# Patient Record
Sex: MALE | Race: WHITE | NOT HISPANIC OR LATINO | Employment: OTHER | URBAN - METROPOLITAN AREA
[De-identification: names, ages, dates, MRNs, and addresses within clinical notes are randomized per-mention and may not be internally consistent; named-entity substitution may affect disease eponyms.]

---

## 2023-08-16 ENCOUNTER — HOSPITAL ENCOUNTER (OUTPATIENT)
Dept: DATA CONVERSION | Facility: HOSPITAL | Age: 73
Discharge: HOME | End: 2023-08-16
Payer: MEDICARE

## 2023-08-16 DIAGNOSIS — Z00.00 ENCOUNTER FOR GENERAL ADULT MEDICAL EXAMINATION WITHOUT ABNORMAL FINDINGS: ICD-10-CM

## 2023-08-16 DIAGNOSIS — M06.09 RHEUMATOID ARTHRITIS WITHOUT RHEUMATOID FACTOR, MULTIPLE SITES (MULTI): ICD-10-CM

## 2023-09-03 PROBLEM — M25.512 SHOULDER PAIN, LEFT: Status: ACTIVE | Noted: 2018-08-05

## 2023-09-03 PROBLEM — R55 SYNCOPE: Status: ACTIVE | Noted: 2023-06-01

## 2023-09-03 PROBLEM — N49.2 ABSCESS OF SCROTUM: Status: ACTIVE | Noted: 2023-09-03

## 2023-09-03 PROBLEM — S56.911A STRAIN OF RIGHT FOREARM: Status: ACTIVE | Noted: 2018-07-18

## 2023-09-03 PROBLEM — L40.9 PSORIASIS: Status: ACTIVE | Noted: 2019-08-01

## 2023-09-03 PROBLEM — E78.5 HYPERLIPIDEMIA: Status: ACTIVE | Noted: 2023-01-20

## 2023-09-03 PROBLEM — R45.4 IRRITABILITY AND ANGER: Status: ACTIVE | Noted: 2022-12-15

## 2023-09-03 PROBLEM — L30.1 DYSHIDROTIC ECZEMA: Status: ACTIVE | Noted: 2018-08-21

## 2023-09-03 PROBLEM — R07.89 ATYPICAL CHEST PAIN: Status: ACTIVE | Noted: 2022-05-03

## 2023-09-03 PROBLEM — R05.9 COUGH: Status: RESOLVED | Noted: 2019-01-28 | Resolved: 2023-09-03

## 2023-09-03 PROBLEM — R42 VERTIGO: Status: ACTIVE | Noted: 2018-06-30

## 2023-09-03 PROBLEM — R06.02 EXERTIONAL SHORTNESS OF BREATH: Status: ACTIVE | Noted: 2019-01-31

## 2023-09-03 PROBLEM — F32.A DEPRESSION: Status: ACTIVE | Noted: 2019-11-21

## 2023-09-03 PROBLEM — G47.30 SLEEP APNEA: Status: ACTIVE | Noted: 2022-08-21

## 2023-09-03 PROBLEM — N50.819 PAIN IN TESTICLE: Status: ACTIVE | Noted: 2023-09-03

## 2023-09-03 PROBLEM — M79.609 LIMB PAIN: Status: ACTIVE | Noted: 2023-09-03
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
## 2023-09-03 PROBLEM — R53.83 FATIGUE: Status: ACTIVE | Noted: 2021-08-04

## 2023-09-03 PROBLEM — R06.83 SNORING: Status: ACTIVE | Noted: 2022-05-04

## 2023-09-03 PROBLEM — M65.30 TRIGGER FINGER, RIGHT: Status: ACTIVE | Noted: 2019-12-05

## 2023-09-03 PROBLEM — R07.81 RIB PAIN ON LEFT SIDE: Status: ACTIVE | Noted: 2020-11-05

## 2023-09-03 PROBLEM — M25.521 RIGHT ELBOW PAIN: Status: ACTIVE | Noted: 2018-07-18

## 2023-09-03 PROBLEM — H11.31 SUBCONJUNCTIVAL HEMORRHAGE OF RIGHT EYE: Status: ACTIVE | Noted: 2020-03-04

## 2023-09-03 PROBLEM — S16.1XXA NECK STRAIN: Status: ACTIVE | Noted: 2019-06-11

## 2023-09-03 PROBLEM — R06.2 WHEEZING: Status: ACTIVE | Noted: 2023-09-03

## 2023-09-03 PROBLEM — K63.5 COLON POLYPS: Status: ACTIVE | Noted: 2023-01-20

## 2023-09-03 PROBLEM — R51.9 HEADACHE: Status: ACTIVE | Noted: 2023-09-03

## 2023-09-03 PROBLEM — R50.9 FEVER: Status: RESOLVED | Noted: 2021-10-19 | Resolved: 2023-09-03

## 2023-09-03 PROBLEM — S52.90XA CLOSED FRACTURE OF RADIUS: Status: ACTIVE | Noted: 2023-09-03

## 2023-09-03 PROBLEM — M19.90 ARTHRITIS: Status: ACTIVE | Noted: 2023-09-03

## 2023-09-03 PROBLEM — E66.9 OBESITY: Status: ACTIVE | Noted: 2023-09-03

## 2023-09-03 PROBLEM — G57.60 MORTON NEUROMA: Status: ACTIVE | Noted: 2018-08-21

## 2023-09-03 PROBLEM — M54.30 SCIATICA: Status: ACTIVE | Noted: 2018-07-19

## 2023-09-03 PROBLEM — R31.9 HEMATURIA: Status: ACTIVE | Noted: 2023-03-16

## 2023-09-03 PROBLEM — J01.90 ACUTE SINUSITIS: Status: RESOLVED | Noted: 2020-01-17 | Resolved: 2023-09-03

## 2023-09-03 PROBLEM — M79.604 LOW BACK PAIN RADIATING TO RIGHT LOWER EXTREMITY: Status: ACTIVE | Noted: 2018-08-05

## 2023-09-03 PROBLEM — F07.81 POSTCONCUSSION SYNDROME: Status: ACTIVE | Noted: 2023-09-03

## 2023-09-03 PROBLEM — N18.2 STAGE 2 CHRONIC KIDNEY DISEASE: Status: ACTIVE | Noted: 2023-09-03

## 2023-09-03 PROBLEM — S49.90XA INJURY OF UPPER EXTREMITY: Status: ACTIVE | Noted: 2023-09-03

## 2023-09-03 PROBLEM — R06.89 BREATHING DIFFICULT: Status: ACTIVE | Noted: 2022-07-26

## 2023-09-03 PROBLEM — I10 HYPERTENSION: Status: ACTIVE | Noted: 2018-12-03

## 2023-09-03 PROBLEM — R60.9 EDEMA: Status: ACTIVE | Noted: 2023-01-20

## 2023-09-03 PROBLEM — M79.641 RIGHT HAND PAIN: Status: ACTIVE | Noted: 2019-11-21

## 2023-09-03 PROBLEM — M54.50 LOW BACK PAIN RADIATING TO RIGHT LOWER EXTREMITY: Status: ACTIVE | Noted: 2018-08-05

## 2023-09-03 PROBLEM — I13.10 HYPERTENSIVE HEART AND KIDNEY DISEASE: Status: ACTIVE | Noted: 2023-09-03

## 2023-09-03 PROBLEM — I47.29 NONSUSTAINED VENTRICULAR TACHYCARDIA (MULTI): Status: ACTIVE | Noted: 2023-09-03

## 2023-09-03 PROBLEM — L60.8 PITTING OF NAILS: Status: ACTIVE | Noted: 2021-08-04

## 2023-09-03 PROBLEM — R52 BODY ACHES: Status: ACTIVE | Noted: 2021-10-19

## 2023-09-03 PROBLEM — J44.9 CHRONIC OBSTRUCTIVE PULMONARY DISEASE (MULTI): Status: ACTIVE | Noted: 2023-09-03

## 2023-09-03 PROBLEM — G57.61 LESION OF PLANTAR NERVE, RIGHT LOWER LIMB: Status: ACTIVE | Noted: 2018-08-21

## 2023-09-03 PROBLEM — K13.70 ORAL MUCOSAL LESION: Status: ACTIVE | Noted: 2019-10-25

## 2023-09-03 PROBLEM — R20.0 NUMBNESS: Status: ACTIVE | Noted: 2023-09-03

## 2023-09-03 RX ORDER — AMLODIPINE BESYLATE 10 MG/1
1 TABLET ORAL DAILY
COMMUNITY
End: 2023-10-17 | Stop reason: ALTCHOICE

## 2023-09-03 RX ORDER — FLUOXETINE HYDROCHLORIDE 20 MG/1
1 CAPSULE ORAL DAILY
COMMUNITY
End: 2023-10-17 | Stop reason: ALTCHOICE

## 2023-09-03 RX ORDER — ETANERCEPT 50 MG/ML
SOLUTION SUBCUTANEOUS
COMMUNITY
Start: 2016-05-16 | End: 2023-10-17 | Stop reason: ALTCHOICE

## 2023-09-03 RX ORDER — FOLIC ACID 1 MG/1
1 TABLET ORAL DAILY
COMMUNITY
Start: 2016-05-20

## 2023-09-03 RX ORDER — ALBUTEROL SULFATE 90 UG/1
2 POWDER, METERED RESPIRATORY (INHALATION) EVERY 6 HOURS PRN
COMMUNITY
Start: 2019-01-29 | End: 2023-10-17 | Stop reason: ALTCHOICE

## 2023-09-03 RX ORDER — FLUTICASONE FUROATE AND VILANTEROL 200; 25 UG/1; UG/1
1 POWDER RESPIRATORY (INHALATION) DAILY
COMMUNITY
End: 2023-10-17 | Stop reason: ALTCHOICE

## 2023-09-03 RX ORDER — ALBUTEROL SULFATE 90 UG/1
2 AEROSOL, METERED RESPIRATORY (INHALATION) EVERY 6 HOURS PRN
COMMUNITY

## 2023-09-03 RX ORDER — ERGOCALCIFEROL 1.25 MG/1
CAPSULE ORAL
COMMUNITY
Start: 2016-07-19 | End: 2023-10-17 | Stop reason: ALTCHOICE

## 2023-09-03 RX ORDER — METOPROLOL SUCCINATE 25 MG/1
25 TABLET, EXTENDED RELEASE ORAL DAILY
COMMUNITY
Start: 2023-05-30 | End: 2023-12-12 | Stop reason: SDUPTHER

## 2023-09-03 RX ORDER — CHOLECALCIFEROL (VITAMIN D3) 50 MCG
1 TABLET ORAL DAILY
COMMUNITY
Start: 2018-06-30 | End: 2023-10-17 | Stop reason: ALTCHOICE

## 2023-09-03 RX ORDER — METHOTREXATE 2.5 MG/1
6 TABLET ORAL
COMMUNITY

## 2023-09-03 RX ORDER — CHOLECALCIFEROL (VITAMIN D3) 50 MCG
1 TABLET ORAL DAILY
COMMUNITY

## 2023-09-03 RX ORDER — IBUPROFEN 600 MG/1
1 TABLET ORAL 3 TIMES DAILY PRN
COMMUNITY
End: 2023-10-17 | Stop reason: ALTCHOICE

## 2023-09-03 RX ORDER — LANOLIN ALCOHOL/MO/W.PET/CERES
400 CREAM (GRAM) TOPICAL 2 TIMES DAILY
COMMUNITY
Start: 2023-05-30

## 2023-09-03 RX ORDER — POTASSIUM CHLORIDE 20 MEQ/1
1 TABLET, EXTENDED RELEASE ORAL DAILY
COMMUNITY
Start: 2023-05-30 | End: 2023-12-12 | Stop reason: SDUPTHER

## 2023-09-03 RX ORDER — ASPIRIN 81 MG/1
81 TABLET ORAL DAILY
COMMUNITY
Start: 2018-06-30

## 2023-09-03 RX ORDER — LISINOPRIL 5 MG/1
5 TABLET ORAL DAILY
COMMUNITY
Start: 2023-05-30

## 2023-09-03 RX ORDER — GOLIMUMAB 50 MG/4ML
SOLUTION INTRAVENOUS
COMMUNITY

## 2023-10-11 DIAGNOSIS — M06.09 RHEUMATOID ARTHRITIS OF MULTIPLE SITES WITHOUT RHEUMATOID FACTOR (MULTI): Primary | ICD-10-CM

## 2023-10-11 RX ORDER — DIPHENHYDRAMINE HYDROCHLORIDE 50 MG/ML
50 INJECTION INTRAMUSCULAR; INTRAVENOUS AS NEEDED
Status: CANCELLED | OUTPATIENT
Start: 2023-10-11

## 2023-10-11 RX ORDER — EPINEPHRINE 0.3 MG/.3ML
0.3 INJECTION SUBCUTANEOUS EVERY 5 MIN PRN
Status: CANCELLED | OUTPATIENT
Start: 2023-10-11

## 2023-10-11 RX ORDER — FAMOTIDINE 10 MG/ML
20 INJECTION INTRAVENOUS ONCE AS NEEDED
Status: CANCELLED | OUTPATIENT
Start: 2023-10-11

## 2023-10-11 RX ORDER — ALBUTEROL SULFATE 0.83 MG/ML
3 SOLUTION RESPIRATORY (INHALATION) AS NEEDED
Status: CANCELLED | OUTPATIENT
Start: 2023-10-11

## 2023-10-12 NOTE — PROGRESS NOTES
Patient to be scheduled for ( continuation ) of Simponi Aria infusions  For Diagnosis: Rheumatoid Arthritis   Dosing is weight based at: 2mg/kg  Using Dosing weight of: _220mg_kg  For a Total Dose of: _200_mg at-8 weeks intervals  Labs…  Hep B SAg Drawn/Results: Negative June 2010.  T-Spot Drawn/Results: _January 2023. Order placed for January 2024  Does the patient have a history of heart failure? No  (may exacerbate HF)  Does the patient have a history of cancer? No  Last infusion received: _8/16/23__ (if continuation)    Due: __10/17/23_    This result meets treatment criteria.

## 2023-10-13 ENCOUNTER — DOCUMENTATION (OUTPATIENT)
Dept: INFUSION THERAPY | Facility: CLINIC | Age: 73
End: 2023-10-13
Payer: MEDICARE

## 2023-10-13 NOTE — PROGRESS NOTES
Patient to be scheduled for (  continuation ) of Simponi Aria infusions  For Diagnosis: Rheumatoid Arthritis   Dosing is weight based at: 2mg/kg  Using Dosing weight of: __225 _kg  For a Total Dose of: 200mg___mg every 8 weeks thereafter (maintenance).  Labs…  Hep B SAg Drawn/Results: __June 2010 negative_  T-Spot Drawn/Results: Updated clearance on 2/15/24. T Spot negative on 12/19/23  Does the patient have a history of heart failure? No  (may exacerbate HF)  Does the patient have a history of cancer? No  Last infusion received: _8/16/223__ (if continuation)    Due: __October_    This result meets treatment criteria.

## 2023-10-17 ENCOUNTER — INFUSION (OUTPATIENT)
Dept: INFUSION THERAPY | Facility: CLINIC | Age: 73
End: 2023-10-17
Payer: MEDICARE

## 2023-10-17 VITALS
RESPIRATION RATE: 18 BRPM | SYSTOLIC BLOOD PRESSURE: 149 MMHG | HEART RATE: 78 BPM | TEMPERATURE: 97.9 F | BODY MASS INDEX: 30.52 KG/M2 | WEIGHT: 206.68 LBS | OXYGEN SATURATION: 98 % | DIASTOLIC BLOOD PRESSURE: 67 MMHG

## 2023-10-17 DIAGNOSIS — M06.9 RHEUMATOID ARTHRITIS OF HAND, UNSPECIFIED LATERALITY, UNSPECIFIED WHETHER RHEUMATOID FACTOR PRESENT (MULTI): Primary | ICD-10-CM

## 2023-10-17 DIAGNOSIS — M06.09 RHEUMATOID ARTHRITIS OF MULTIPLE SITES WITHOUT RHEUMATOID FACTOR (MULTI): ICD-10-CM

## 2023-10-17 PROCEDURE — 96365 THER/PROPH/DIAG IV INF INIT: CPT | Performed by: NURSE PRACTITIONER

## 2023-10-17 RX ORDER — EPINEPHRINE 0.3 MG/.3ML
0.3 INJECTION SUBCUTANEOUS EVERY 5 MIN PRN
Status: CANCELLED | OUTPATIENT
Start: 2023-12-12

## 2023-10-17 RX ORDER — SULFAMETHOXAZOLE AND TRIMETHOPRIM 800; 160 MG/1; MG/1
1 TABLET ORAL 2 TIMES DAILY
COMMUNITY
Start: 2023-03-16 | End: 2023-03-23

## 2023-10-17 RX ORDER — FAMOTIDINE 10 MG/ML
20 INJECTION INTRAVENOUS ONCE AS NEEDED
Status: CANCELLED | OUTPATIENT
Start: 2023-12-12

## 2023-10-17 RX ORDER — DIPHENHYDRAMINE HYDROCHLORIDE 50 MG/ML
50 INJECTION INTRAMUSCULAR; INTRAVENOUS AS NEEDED
Status: CANCELLED | OUTPATIENT
Start: 2023-12-12

## 2023-10-17 RX ORDER — ALBUTEROL SULFATE 0.83 MG/ML
3 SOLUTION RESPIRATORY (INHALATION) AS NEEDED
Status: CANCELLED | OUTPATIENT
Start: 2023-12-12

## 2023-10-17 ASSESSMENT — ENCOUNTER SYMPTOMS
DEPRESSION: 0
LOSS OF SENSATION IN FEET: 0
OCCASIONAL FEELINGS OF UNSTEADINESS: 0

## 2023-10-17 ASSESSMENT — PAIN SCALES - GENERAL: PAINLEVEL: 0-NO PAIN

## 2023-10-17 NOTE — PROGRESS NOTES
[facility]   infusion Clinic Note   Date: 2023   Name: Caden Simon  : 1950   MRN: [id number]         Reason for Visit: OP Infusion (Simponi 200mg)      Accompanied by:Self   Visit Type:: Infusion   Diagnosis: Rheumatoid arthritis of hand, unspecified laterality, unspecified whether rheumatoid factor present (CMS/HCC)    Rheumatoid arthritis of multiple sites without rheumatoid factor (CMS/HCC)    Allergies:   Allergies as of 10/17/2023 - Reviewed 10/17/2023   Allergen Reaction Noted    Oxycodone-acetaminophen Other 2023      Current Meds has a current medication list which includes the following prescription(s): proair respiclick, albuterol, amlodipine, aspirin, cholecalciferol, cholecalciferol, ergocalciferol, enbrel, fluoxetine, fluticasone furoate-vilanterol, folic acid, simponi aria, ibuprofen, lisinopril, magnesium oxide, methotrexate, metoprolol succinate xl, miscellaneous medical supply, multivitamin, and potassium chloride cr, and the following Facility-Administered Medications: golimumab (Simponi Aria) 200 mg in sodium chloride 0.9% 100 mL IV.        Vitals:  Vitals:    10/17/23 0935   BP: 149/67   Pulse: 78   Resp: 18   Temp: 36.6 °C (97.9 °F)   SpO2: 98%   Weight: 93.8 kg (206 lb 10.9 oz)      Infusion Pre-procedure Checklist   Allergies reviewed: yes   Medications reviewed: yes   Contraindications to treatment:No   Previous reaction to current treatment:No   Current Health Issues: None   Pain: 0-no pain [0]'    Is the pain different from normal: No   Is the pain tolerable: n/a   Is your Doctor aware: n/a   Contraindications based on patient history: No   Provider notified: Not applicable   Labs: None   Fall Risk Screening:      Review of Systems   All other systems reviewed and are negative.     Negative for complaint: [x] all other systems have been reviewed and are negative for complaint   Infusion Readiness:   Assessment Concerns Related to  Infusion: No  Provider notified: n/a  Assess patient for the concerns below. Document provider notification as appropriate:  - Does not meet criteria to treat N/A  - Has an active or recent infection with/without current antibiotic use N/A  - Has recent/planned dental work N/A  - Has recent/planned surgeries N/A  - Has recently received or plans to receive vaccinations N/A  - Has treatment related toxicities N/A  - Is pregnant (unless noted otherwise) N/A    Initiated By: Darby Warren RN   Time: 10:13 AM     We administered golimumab (Simponi Aria) 200 mg in sodium chloride 0.9% 100 mL IV.    ARE YOU UP TO DATE ON ALL OF YOUR IMMUNIZATIONS? yes    ANY HISTORY OF HEART FAILURE? yes    ANY HISTORY OF CANCER? yes    REMINDER:  WEIGHT BASED DRUG   Patient's dosing weight (kg): 99kg    10% weight variance for prescribed treatment: 89.1kg to 108.9kg     Patient's weight today: 93.75kg      weight range for prescribed dose: na    Patient weight today falls outside of 10% variance or  weight range: NO     Doses that are weight based have an acceptable variance rule within 10% of the prescribed   order and/or within  weight range. If patient weight on day of infusion falls   outside of the 10% variance, or weight range, infusion is administered and   pharmacy contacted regarding future dosing adjustments, per policy.

## 2023-10-17 NOTE — PATIENT INSTRUCTIONS
Today you received: Simponi 198mg  For:   1. Rheumatoid arthritis of hand, unspecified laterality, unspecified whether rheumatoid factor present (CMS/[facility])    2. Rheumatoid arthritis of multiple sites without rheumatoid factor (CMS/[facility])          Please read the  Medication Guide that was given to you and reviewed during todays visit.     (Tell all doctors including dentists that you are taking this medication)     Go to the emergency room or call 911 if:  -You have signs of allergic reaction:   o         Rash, hives, itching.   o         Swollen, blistered, peeling skin.   o         Swelling of face, lips, mouth, tongue or throat.   o         Tightness of chest, trouble breathing, swallowing or talking      Call your doctor:     - If IV / injection site gets red, warm, swollen, itchy or leaks fluid or pus.     (Leave dressing on your IV site for at least 2 hours and keep area clean and dry  - If you get sick or have symptoms of infection or are not feeling well for any reason.    (Wash your hands often, stay away from people who are sick)  - If you have side effects from your medication that do not go away or are bothersome.     (Refer to the teaching your nurse gave you for side effects to call your doctor about)     Common side effects may include:  stuffy nose, headache, feeling tired, muscle aches, upset stomach  - Before receiving any vaccines, Call the [facility] at   if:  - You get sick, are on antibiotics, have had a recent vaccine, have surgery or dental work and your doctor wants your visit rescheduled.  - You need to cancel and reschedule your visit for any reason. Call at least 2 days before your visit if you need to cancel.   - Your insurance changes before your next visit.    (We will need to get approval from your new insurance. This can take up to two weeks.)     The [facility] is opened Monday thru Friday. We are closed on weekends and holidays.     Voice mail will  take your call if the center is closed. If you leave a message please allow 24 hours for a call back during weekdays. If you leave a message on a weekend/holiday, we will call you back the next business day.

## 2023-11-01 DIAGNOSIS — I10 HYPERTENSION, UNSPECIFIED TYPE: ICD-10-CM

## 2023-11-01 RX ORDER — AMLODIPINE BESYLATE 10 MG/1
10 TABLET ORAL DAILY
Qty: 90 TABLET | Refills: 0 | Status: SHIPPED | OUTPATIENT
Start: 2023-11-01

## 2023-11-15 ENCOUNTER — TELEPHONE (OUTPATIENT)
Dept: PRIMARY CARE | Facility: CLINIC | Age: 73
End: 2023-11-15
Payer: MEDICARE

## 2023-11-16 DIAGNOSIS — Z23 ENCOUNTER FOR IMMUNIZATION: Primary | ICD-10-CM

## 2023-11-28 ENCOUNTER — OFFICE VISIT (OUTPATIENT)
Dept: PRIMARY CARE | Facility: CLINIC | Age: 73
End: 2023-11-28
Payer: MEDICARE

## 2023-11-28 VITALS
HEART RATE: 93 BPM | WEIGHT: 210 LBS | BODY MASS INDEX: 31.01 KG/M2 | SYSTOLIC BLOOD PRESSURE: 130 MMHG | DIASTOLIC BLOOD PRESSURE: 82 MMHG

## 2023-11-28 DIAGNOSIS — R35.1 NOCTURIA: Primary | ICD-10-CM

## 2023-11-28 DIAGNOSIS — I10 PRIMARY HYPERTENSION: ICD-10-CM

## 2023-11-28 PROBLEM — R52 BODY ACHES: Status: RESOLVED | Noted: 2021-10-19 | Resolved: 2023-11-28

## 2023-11-28 PROBLEM — N49.2 ABSCESS OF SCROTUM: Status: RESOLVED | Noted: 2023-09-03 | Resolved: 2023-11-28

## 2023-11-28 PROCEDURE — 3079F DIAST BP 80-89 MM HG: CPT | Performed by: PHYSICIAN ASSISTANT

## 2023-11-28 PROCEDURE — 99213 OFFICE O/P EST LOW 20 MIN: CPT | Performed by: PHYSICIAN ASSISTANT

## 2023-11-28 PROCEDURE — 1126F AMNT PAIN NOTED NONE PRSNT: CPT | Performed by: PHYSICIAN ASSISTANT

## 2023-11-28 PROCEDURE — 1036F TOBACCO NON-USER: CPT | Performed by: PHYSICIAN ASSISTANT

## 2023-11-28 PROCEDURE — 3075F SYST BP GE 130 - 139MM HG: CPT | Performed by: PHYSICIAN ASSISTANT

## 2023-11-28 PROCEDURE — 1159F MED LIST DOCD IN RCRD: CPT | Performed by: PHYSICIAN ASSISTANT

## 2023-11-28 RX ORDER — TAMSULOSIN HYDROCHLORIDE 0.4 MG/1
0.4 CAPSULE ORAL DAILY
Qty: 30 CAPSULE | Refills: 2 | Status: SHIPPED | OUTPATIENT
Start: 2023-11-28 | End: 2024-11-27

## 2023-11-28 ASSESSMENT — ENCOUNTER SYMPTOMS
SHORTNESS OF BREATH: 1
PALPITATIONS: 0
HYPERTENSION: 1
ORTHOPNEA: 0

## 2023-11-28 ASSESSMENT — PAIN SCALES - GENERAL: PAINLEVEL: 0-NO PAIN

## 2023-11-28 NOTE — PROGRESS NOTES
Subjective   Patient ID: Caden Simon is a 73 y.o. male who presents for Hypertension (Only medication for HTN he is taking is amlodipine, states he didn't refill metoprolol or lisinopril. Requesting referral for urologist for nocturia.).    Hypertension  The problem is controlled. Associated symptoms include shortness of breath. Pertinent negatives include no chest pain, orthopnea, palpitations or peripheral edema.    He got  few weeks ago as well.    Review of Systems   Respiratory:  Positive for shortness of breath.    Cardiovascular:  Negative for chest pain, palpitations and orthopnea.       Objective   BP (!) 134/94   Pulse 93   Wt 95.3 kg (210 lb)   BMI 31.01 kg/m²     Physical Exam  Constitutional:       Appearance: He is obese.   Cardiovascular:      Rate and Rhythm: Normal rate and regular rhythm.      Pulses: Normal pulses.      Heart sounds: No murmur heard.  Pulmonary:      Effort: Pulmonary effort is normal. No respiratory distress.   Musculoskeletal:      Right lower leg: Edema present.      Left lower leg: Edema present.   Neurological:      General: No focal deficit present.      Mental Status: He is alert. Mental status is at baseline.         Assessment/Plan   Diagnoses and all orders for this visit:  Nocturia  -     tamsulosin (Flomax) 0.4 mg 24 hr capsule; Take 1 capsule (0.4 mg) by mouth once daily.  Primary hypertension    Continue current medication.He is willing to try a prostate medication.  We will try Flomax.  Risks and benefits of medication discussed.  Certainly if no better may try different medicine or refer to urology.We will follow-up with cardiology regarding his other 2 blood pressure pills as well.

## 2023-12-11 ENCOUNTER — HOSPITAL ENCOUNTER (OUTPATIENT)
Dept: RADIOLOGY | Facility: HOSPITAL | Age: 73
Discharge: HOME | End: 2023-12-11
Payer: MEDICARE

## 2023-12-11 DIAGNOSIS — R05.1 ACUTE COUGH: ICD-10-CM

## 2023-12-11 PROCEDURE — 71046 X-RAY EXAM CHEST 2 VIEWS: CPT

## 2023-12-12 ENCOUNTER — OFFICE VISIT (OUTPATIENT)
Dept: CARDIOLOGY | Facility: CLINIC | Age: 73
End: 2023-12-12
Payer: MEDICARE

## 2023-12-12 VITALS — DIASTOLIC BLOOD PRESSURE: 68 MMHG | SYSTOLIC BLOOD PRESSURE: 122 MMHG

## 2023-12-12 DIAGNOSIS — R55 SYNCOPE, UNSPECIFIED SYNCOPE TYPE: ICD-10-CM

## 2023-12-12 DIAGNOSIS — I10 PRIMARY HYPERTENSION: ICD-10-CM

## 2023-12-12 PROCEDURE — 1126F AMNT PAIN NOTED NONE PRSNT: CPT | Performed by: INTERNAL MEDICINE

## 2023-12-12 PROCEDURE — 1159F MED LIST DOCD IN RCRD: CPT | Performed by: INTERNAL MEDICINE

## 2023-12-12 PROCEDURE — 3078F DIAST BP <80 MM HG: CPT | Performed by: INTERNAL MEDICINE

## 2023-12-12 PROCEDURE — 99214 OFFICE O/P EST MOD 30 MIN: CPT | Performed by: INTERNAL MEDICINE

## 2023-12-12 PROCEDURE — 3074F SYST BP LT 130 MM HG: CPT | Performed by: INTERNAL MEDICINE

## 2023-12-12 PROCEDURE — 1036F TOBACCO NON-USER: CPT | Performed by: INTERNAL MEDICINE

## 2023-12-12 RX ORDER — POTASSIUM CHLORIDE 20 MEQ/1
20 TABLET, EXTENDED RELEASE ORAL DAILY
Qty: 90 TABLET | Refills: 3 | Status: SHIPPED | OUTPATIENT
Start: 2023-12-12 | End: 2024-12-06

## 2023-12-12 RX ORDER — METOPROLOL SUCCINATE 25 MG/1
25 TABLET, EXTENDED RELEASE ORAL DAILY
Qty: 90 TABLET | Refills: 3 | Status: SHIPPED | OUTPATIENT
Start: 2023-12-12 | End: 2024-12-06

## 2023-12-12 ASSESSMENT — PAIN SCALES - GENERAL: PAINLEVEL: 0-NO PAIN
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
## 2023-12-19 ENCOUNTER — INFUSION (OUTPATIENT)
Dept: INFUSION THERAPY | Facility: CLINIC | Age: 73
End: 2023-12-19
Payer: MEDICARE

## 2023-12-19 VITALS
TEMPERATURE: 97.4 F | HEART RATE: 90 BPM | OXYGEN SATURATION: 98 % | WEIGHT: 209.44 LBS | RESPIRATION RATE: 18 BRPM | BODY MASS INDEX: 30.93 KG/M2 | DIASTOLIC BLOOD PRESSURE: 71 MMHG | SYSTOLIC BLOOD PRESSURE: 152 MMHG

## 2023-12-19 DIAGNOSIS — M06.09 RHEUMATOID ARTHRITIS OF MULTIPLE SITES WITHOUT RHEUMATOID FACTOR (MULTI): Primary | ICD-10-CM

## 2023-12-19 DIAGNOSIS — M06.9 RHEUMATOID ARTHRITIS OF HAND, UNSPECIFIED LATERALITY, UNSPECIFIED WHETHER RHEUMATOID FACTOR PRESENT (MULTI): ICD-10-CM

## 2023-12-19 PROCEDURE — 36415 COLL VENOUS BLD VENIPUNCTURE: CPT

## 2023-12-19 PROCEDURE — 96365 THER/PROPH/DIAG IV INF INIT: CPT | Performed by: NURSE PRACTITIONER

## 2023-12-19 PROCEDURE — 86481 TB AG RESPONSE T-CELL SUSP: CPT

## 2023-12-19 RX ORDER — EPINEPHRINE 0.3 MG/.3ML
0.3 INJECTION SUBCUTANEOUS EVERY 5 MIN PRN
Status: CANCELLED | OUTPATIENT
Start: 2024-02-13

## 2023-12-19 RX ORDER — ALBUTEROL SULFATE 0.83 MG/ML
3 SOLUTION RESPIRATORY (INHALATION) AS NEEDED
Status: CANCELLED | OUTPATIENT
Start: 2024-01-15

## 2023-12-19 RX ORDER — FAMOTIDINE 10 MG/ML
20 INJECTION INTRAVENOUS ONCE AS NEEDED
Status: CANCELLED | OUTPATIENT
Start: 2024-02-13

## 2023-12-19 RX ORDER — EPINEPHRINE 0.3 MG/.3ML
0.3 INJECTION SUBCUTANEOUS EVERY 5 MIN PRN
Status: CANCELLED | OUTPATIENT
Start: 2024-01-15

## 2023-12-19 RX ORDER — ALBUTEROL SULFATE 0.83 MG/ML
3 SOLUTION RESPIRATORY (INHALATION) AS NEEDED
Status: CANCELLED | OUTPATIENT
Start: 2024-02-13

## 2023-12-19 RX ORDER — DIPHENHYDRAMINE HYDROCHLORIDE 50 MG/ML
50 INJECTION INTRAMUSCULAR; INTRAVENOUS AS NEEDED
Status: CANCELLED | OUTPATIENT
Start: 2024-02-13

## 2023-12-19 RX ORDER — FAMOTIDINE 10 MG/ML
20 INJECTION INTRAVENOUS ONCE AS NEEDED
Status: CANCELLED | OUTPATIENT
Start: 2024-01-15

## 2023-12-19 RX ORDER — DIPHENHYDRAMINE HYDROCHLORIDE 50 MG/ML
50 INJECTION INTRAMUSCULAR; INTRAVENOUS AS NEEDED
Status: CANCELLED | OUTPATIENT
Start: 2024-01-15

## 2023-12-19 ASSESSMENT — ENCOUNTER SYMPTOMS
DIZZINESS: 0
COUGH: 0
ABDOMINAL PAIN: 0
UNEXPECTED WEIGHT CHANGE: 0
FATIGUE: 0
NUMBNESS: 0
LIGHT-HEADEDNESS: 0
LEG SWELLING: 0
PALPITATIONS: 0
APPETITE CHANGE: 0
FEVER: 0
DIARRHEA: 0
TROUBLE SWALLOWING: 0
NERVOUS/ANXIOUS: 0
CONSTIPATION: 0
DYSURIA: 0
EYE PROBLEMS: 0
DEPRESSION: 0
WOUND: 0
HEMATURIA: 0
EXTREMITY WEAKNESS: 0
NAUSEA: 0
BLOOD IN STOOL: 0
MYALGIAS: 0
SORE THROAT: 0
VOMITING: 0
CHEST TIGHTNESS: 0
HEADACHES: 0
ARTHRALGIAS: 0
FREQUENCY: 0
SHORTNESS OF BREATH: 0
WHEEZING: 0
CHILLS: 0

## 2023-12-19 ASSESSMENT — PAIN SCALES - GENERAL: PAINLEVEL: 0-NO PAIN

## 2023-12-19 NOTE — PROGRESS NOTES
[facility]   infusion Clinic Note   Date: 2023   Name: Caden Simon  : 1950   MRN: [id number]         Reason for Visit: OP Infusion (Simponi Aria)         Visit Type: INFUSION      Ordered By: Dr. Chavez      Accompanied by:Self      Diagnosis: Rheumatoid arthritis of multiple sites without rheumatoid factor (CMS/[facility])       Allergies:   Allergies as of 2023 - Reviewed 2023   Allergen Reaction Noted    Oxycodone-acetaminophen Other 2023         Current Medications has a current medication list which includes the following prescription(s): albuterol, amlodipine, aspirin, cholecalciferol, folic acid, simponi aria, lisinopril, magnesium oxide, methotrexate, metoprolol succinate xl, multivitamin, potassium chloride cr, and tamsulosin, and the following Facility-Administered Medications: golimumab (Simponi Aria) 200 mg in sodium chloride 0.9% 100 mL IV.       Vitals:  Vitals:    23 0936   BP: 162/74   Pulse: 110   Resp: 18   Temp: 36 °C (96.8 °F)   SpO2: 98%   Weight: 95 kg (209 lb 7 oz)             Infusion Pre-procedure Checklist:   - Allergies reviewed: yes   - Medications reviewed: yes       - Previous reaction to current treatment: no      Assess patient for the concerns below. Document provider notification as appropriate.  - Active or recent infection with/without current antibiotic use: no  - Recent or planned invasive dental work: no  - Recent or planned surgeries: no  - Recently received or plans to receive vaccinations: no  - Has treatment related toxicities: no  - Is pregnant:  n/a      Pain: 0   - Is the pain different from normal: n/a   - Is the pain tolerable: n/a   - Is your Doctor aware:  n/a      Labs: N/A         Fall Risk Screening: Jack Fall Risk  History of Falling, Immediate or Within 3 Months: No  Secondary Diagnosis: Yes  Ambulatory Aid: Walks without aid/bedrest/nurse assist  Intravenous Therapy/Heparin Lock:  Yes  Gait/Transferring: Normal/bedrest/immobile  Mental Status: Oriented to own ability  Santiago Fall Risk Score: 35       Review Of Systems:  Review of Systems   Constitutional:  Negative for appetite change, chills, fatigue, fever and unexpected weight change.   HENT:   Negative for hearing loss, mouth sores, sore throat, tinnitus and trouble swallowing.    Eyes:  Negative for eye problems.   Respiratory:  Negative for chest tightness, cough, shortness of breath and wheezing.    Cardiovascular:  Negative for chest pain, leg swelling and palpitations.   Gastrointestinal:  Negative for abdominal pain, blood in stool, constipation, diarrhea, nausea and vomiting.   Genitourinary:  Negative for dysuria, frequency and hematuria.    Musculoskeletal:  Negative for arthralgias, gait problem and myalgias.   Skin:  Negative for itching, rash and wound.   Neurological:  Negative for dizziness, extremity weakness, gait problem, headaches, light-headedness and numbness.   Psychiatric/Behavioral:  Negative for depression. The patient is not nervous/anxious.          Infusion Readiness:   - Assessment Concerns Related to Infusion: No  - Provider notified: n/a      Document Below Only If Indicated:   New Patient Education:    N/A (returning patient for continuation of therapy. Ongoing education provided as needed.)        Treatment Conditions & Drug Specific Questions:    Golimumab  (SIMPONI)    (Unless otherwise specified on patient specific therapy plan):     TREATMENT CONDITIONS:  Unless otherwise specified on patient specific thearpy plan HOLD and notify provider prior to proceeding with today's infusion if patient with:  o Positive T-Spot  o Positive Hepatitis B Surface Ag        Labs reviewed and patient meets treatment conditions? Yes    DRUG SPECIFIC QUESTIONS:   - Up to date on all immunizations? Yes    Immunization History   Administered Date(s) Administered    Flu vaccine, quadrivalent, high-dose, preservative free, age  65y+ (FLUZONE) 11/02/2020, 12/07/2021, 11/01/2022, 11/16/2023    Influenza, High Dose Seasonal, Preservative Free 10/15/2018, 10/28/2019, 11/04/2019    Influenza, injectable, quadrivalent 10/20/2017    Influenza, seasonal, injectable 10/01/2018, 10/01/2020    Moderna SARS-CoV-2 Vaccination 03/11/2021, 04/07/2021    Pneumococcal conjugate vaccine, 13-valent (PREVNAR 13) 03/08/2019, 03/21/2019    Pneumococcal polysaccharide vaccine, 23-valent, age 2 years and older (PNEUMOVAX 23) 11/06/2002, 11/07/2016    Zoster vaccine, recombinant, adult (SHINGRIX) 10/01/2019, 10/22/2019, 02/28/2020, 10/01/2020         - Any history of heart failure? No     ( Simponi may cause exacerbation of heart failure)     - Any history of cancer? No    (Box Warning: Malignancy)      REMINDER:  WEIGHT BASED DRUG    Recommended Vitals/Observation:  Vitals: Take vital signs prior to starting infusion, at infusion conclusion and as needed.   Observation: No observation.        Weight Based Drug Calculations:    WEIGHT BASED DRUGS: Golimumab (SIMPONI)   Patient's dosing weight (kg): 99 kg    10% weight variance for prescribed treatment:  89.1 kg to 108.9 kg     Patient's weight today: 95 kg  Vitals:    12/19/23 0936   Weight: 95 kg (209 lb 7 oz)         weight range for prescribed dose:     Patient weight today falls outside of 10% variance or  weight range: No     Home Care pharmacist informed of weight variance: Not applicable    Doses that are weight based have an acceptable variance rule within 10% of the prescribed   order and/or within  weight range. If patient weight on day of infusion falls   outside of the 10% variance, or weight range, infusion is administered and   pharmacy contacted regarding future dosing adjustments, per policy.         Initiated By: REBEKAH Welsh   Time: 10:24 AM     We administered golimumab (Simponi Aria) 200 mg in sodium chloride 0.9% 100 mL IV.

## 2023-12-21 LAB
NIL(NEG) CONTROL SPOT COUNT: NORMAL
PANEL A SPOT COUNT: 0
PANEL B SPOT COUNT: 0
POS CONTROL SPOT COUNT: NORMAL
T-SPOT. TB INTERPRETATION: NEGATIVE

## 2024-02-13 ENCOUNTER — INFUSION (OUTPATIENT)
Dept: INFUSION THERAPY | Facility: CLINIC | Age: 74
End: 2024-02-13
Payer: MEDICARE

## 2024-02-13 VITALS
TEMPERATURE: 98.2 F | BODY MASS INDEX: 31.54 KG/M2 | DIASTOLIC BLOOD PRESSURE: 70 MMHG | WEIGHT: 213.6 LBS | RESPIRATION RATE: 18 BRPM | SYSTOLIC BLOOD PRESSURE: 148 MMHG | HEART RATE: 71 BPM | OXYGEN SATURATION: 98 %

## 2024-02-13 DIAGNOSIS — M06.09 RHEUMATOID ARTHRITIS OF MULTIPLE SITES WITHOUT RHEUMATOID FACTOR (MULTI): ICD-10-CM

## 2024-02-13 LAB — HBV SURFACE AG SERPL QL IA: NONREACTIVE

## 2024-02-13 PROCEDURE — 96365 THER/PROPH/DIAG IV INF INIT: CPT | Performed by: NURSE PRACTITIONER

## 2024-02-13 PROCEDURE — 87340 HEPATITIS B SURFACE AG IA: CPT

## 2024-02-13 PROCEDURE — 36415 COLL VENOUS BLD VENIPUNCTURE: CPT

## 2024-02-13 RX ORDER — DIPHENHYDRAMINE HYDROCHLORIDE 50 MG/ML
50 INJECTION INTRAMUSCULAR; INTRAVENOUS AS NEEDED
Status: CANCELLED | OUTPATIENT
Start: 2024-04-09

## 2024-02-13 RX ORDER — FAMOTIDINE 10 MG/ML
20 INJECTION INTRAVENOUS ONCE AS NEEDED
Status: CANCELLED | OUTPATIENT
Start: 2024-04-09

## 2024-02-13 RX ORDER — ALBUTEROL SULFATE 0.83 MG/ML
3 SOLUTION RESPIRATORY (INHALATION) AS NEEDED
Status: CANCELLED | OUTPATIENT
Start: 2024-04-09

## 2024-02-13 RX ORDER — EPINEPHRINE 0.3 MG/.3ML
0.3 INJECTION SUBCUTANEOUS EVERY 5 MIN PRN
Status: CANCELLED | OUTPATIENT
Start: 2024-04-09

## 2024-02-13 ASSESSMENT — PAIN SCALES - GENERAL: PAINLEVEL: 0-NO PAIN

## 2024-02-13 NOTE — PATIENT INSTRUCTIONS
Today :We administered golimumab (Simponi Aria) 200 mg in sodium chloride 0.9% 100 mL IV.     For:   1. Rheumatoid arthritis of multiple sites without rheumatoid factor (CMS/[facility])         Your next appointment is due in:  YOU WILL NEED NEW ORDERS BEFORE YOU CAN SCHEDULE YOUR NEXT APPOINTMENT FOR INFUSION.        Please read the  Medication Guide that was given to you and reviewed during todays visit.     (Tell all doctors including dentists that you are taking this medication)     Go to the emergency room or call 911 if:  -You have signs of allergic reaction:   -Rash, hives, itching.   -Swollen, blistered, peeling skin.   -Swelling of face, lips, mouth, tongue or throat.   -Tightness of chest, trouble breathing, swallowing or talking     Call your doctor:  - If IV / injection site gets red, warm, swollen, itchy or leaks fluid or pus.     (Leave dressing on your IV site for at least 2 hours and keep area clean and dry  - If you get sick or have symptoms of infection or are not feeling well for any reason.    (Wash your hands often, stay away from people who are sick)  - If you have side effects from your medication that do not go away or are bothersome.     (Refer to the teaching your nurse gave you for side effects to call your doctor about)    - Common side effects may include:  stuffy nose, headache, feeling tired, muscle aches, upset stomach  - Before receiving any vaccines     - Call the [facility] at   If:  - You get sick, are on antibiotics, have had a recent vaccine, have surgery or dental work and your doctor wants your visit rescheduled.  - You need to cancel and reschedule your visit for any reason. Call at least 2 days before your visit if you need to cancel.   - Your insurance changes before your next visit.    (We will need to get approval from your new insurance. This can take up to two weeks.)     [facility] is opened Monday thru Friday. We are closed on weekends  and holidays.   Voice mail will take your call if the center is closed. If you leave a message please allow 24 hours for a call back during weekdays. If you leave a message on a weekend/holiday, we will call you back the next business day.

## 2024-02-13 NOTE — PROGRESS NOTES
[facility]   infusion Clinic Note   Date: 2024   Name: Caden Simon  : 1950   MRN: [id number]         Reason for Visit: OP Infusion (Simponi)         Visit Type: INFUSION       Ordered By DR CORONA      Accompanied by:Self      Diagnosis: Rheumatoid arthritis of multiple sites without rheumatoid factor (CMS/[facility])       Allergies:   Allergies as of 2024 - Reviewed 2024   Allergen Reaction Noted    Oxycodone-acetaminophen Other 2023         Current Medications has a current medication list which includes the following prescription(s): albuterol, amlodipine, aspirin, cholecalciferol, folic acid, simponi aria, lisinopril, magnesium oxide, methotrexate, metoprolol succinate xl, multivitamin, potassium chloride cr, and tamsulosin, and the following Facility-Administered Medications: golimumab (Simponi Aria) 200 mg in sodium chloride 0.9% 100 mL IV.       Vitals:   Vitals:    24 0933   BP: 173/70   Pulse: 89   Resp: 20   Temp: 36.2 °C (97.1 °F)   TempSrc: Temporal   SpO2: 99%   Weight: 96.9 kg (213 lb 9.6 oz)   PainSc: 0-No pain             Infusion Pre-procedure Checklist:   - Allergies reviewed: yes   - Medications reviewed: yes       - Previous reaction to current treatment: no      Assess patient for the concerns below. Document provider notification as appropriate.  - Active or recent infection with/without current antibiotic use: no  - Recent or planned invasive dental work: no  - Recent or planned surgeries: no  - Recently received or plans to receive vaccinations: no  - Has treatment related toxicities: no  - Is pregnant:  n/a      Pain: 0   - Is the pain different from normal: no   - Is the pain tolerable: no   - Is your Doctor aware:  no      Labs: Labs drawn and sent per order         Fall Risk Screening: Jack Fall Risk  History of Falling, Immediate or Within 3 Months: No  Secondary Diagnosis: No  Ambulatory Aid: Walks without  "aid/bedrest/nurse assist  Intravenous Therapy/Heparin Lock: No  Gait/Transferring: Normal/bedrest/immobile  Mental Status: Oriented to own ability  Santiago Fall Risk Score: 0       Review Of Systems:  Review of Systems   All other systems reviewed and are negative.        Infusion Readiness:   - Assessment Concerns Related to Infusion: No  - Provider notified: no      Document Below Only If Indicated:   New Patient Education:    N/A (returning patient for continuation of therapy. Ongoing education provided as needed.)        Treatment Conditions & Drug Specific Questions:    Golimumab  (SIMPONI)    (Unless otherwise specified on patient specific therapy plan):     TREATMENT CONDITIONS:  Unless otherwise specified on patient specific thearpy plan HOLD and notify provider prior to proceeding with today's infusion if patient with:  o Positive T-Spot  o Positive Hepatitis B Surface Ag    Lab Results   Component Value Date    TBSIN Negative 12/19/2023      No results found for: \"HAGCN\", \"HEPBSURABI\", \"HBSAG\", \"XHAGF\", \"HEPBSAG\", \"EXTHEPBSAG\", \"NONUHSWGH\"   No results found for: \"NONUHFIRE\", \"NONUHSWGH\", \"NONUHFISH\", \"EXTHEPBSAG\"  No results found for: \"HEPATOT\", \"HEPAIGM\", \"HEPBCIGM\", \"HEPBCAB\", \"HBEAG\", \"HEPCAB\"   No results found for: \"HEPCAB\"  No results found for: \"HBCTI\", \"HEPBCAB\"    Labs reviewed and patient meets treatment conditions? Yes    DRUG SPECIFIC QUESTIONS:   - Up to date on all immunizations? Yes    Immunization History   Administered Date(s) Administered    Flu vaccine, quadrivalent, high-dose, preservative free, age 65y+ (FLUZONE) 11/02/2020, 12/07/2021, 11/01/2022, 11/16/2023    Influenza, High Dose Seasonal, Preservative Free 10/15/2018, 10/28/2019, 11/04/2019    Influenza, injectable, quadrivalent 10/20/2017    Influenza, seasonal, injectable 10/01/2018, 10/01/2020    Moderna SARS-CoV-2 Vaccination 03/11/2021, 04/07/2021    Pneumococcal conjugate vaccine, 13-valent (PREVNAR 13) 03/08/2019, 03/21/2019 "    Pneumococcal polysaccharide vaccine, 23-valent, age 2 years and older (PNEUMOVAX 23) 11/06/2002, 11/07/2016    Zoster vaccine, recombinant, adult (SHINGRIX) 10/01/2019, 10/22/2019, 02/28/2020, 10/01/2020         - Any history of heart failure? No     ( Simponi may cause exacerbation of heart failure)     - Any history of cancer? No    (Box Warning: Malignancy)      REMINDER:  WEIGHT BASED DRUG    Recommended Vitals/Observation:  Vitals: Take vital signs prior to starting infusion, at infusion conclusion and as needed.   Observation: No observation.        Weight Based Drug Calculations:    WEIGHT BASED DRUGS: Golimumab (SIMPONI)   Patient's dosing weight (kg): 102.2 KG     10% weight variance for prescribed treatment: 92 kg to 112.4 kg     Patient's weight today:   Vitals:    02/13/24 0933   Weight: 96.9 kg (213 lb 9.6 oz)         weight range for prescribed dose:     Patient weight today falls outside of 10% variance or  weight range: No    [facility] pharmacist informed of weight variance: Not applicable    Doses that are weight based have an acceptable variance rule within 10% of the prescribed   order and/or within  weight range. If patient weight on day of infusion falls   outside of the 10% variance, or weight range, infusion is administered and   pharmacy contacted regarding future dosing adjustments, per policy.         Initiated By: Rochelle Orourke RN   Time: 10:15 AM     We administered golimumab (Simponi Aria) 200 mg in sodium chloride 0.9% 100 mL IV.    1042-IV line flushed with 30ml Normal Saline. Patient tolerated infusion well. No signs or symptoms of reaction noted.

## 2024-04-09 ENCOUNTER — APPOINTMENT (OUTPATIENT)
Dept: INFUSION THERAPY | Facility: CLINIC | Age: 74
End: 2024-04-09
Payer: MEDICARE

## 2024-09-01 DIAGNOSIS — I10 PRIMARY HYPERTENSION: ICD-10-CM

## 2024-09-01 DIAGNOSIS — R55 SYNCOPE, UNSPECIFIED SYNCOPE TYPE: ICD-10-CM

## 2024-09-04 RX ORDER — POTASSIUM CHLORIDE 1500 MG/1
20 TABLET, EXTENDED RELEASE ORAL DAILY
Qty: 90 TABLET | Refills: 0 | Status: SHIPPED | OUTPATIENT
Start: 2024-09-04

## 2024-09-04 RX ORDER — METOPROLOL SUCCINATE 25 MG/1
25 TABLET, EXTENDED RELEASE ORAL DAILY
Qty: 90 TABLET | Refills: 0 | Status: SHIPPED | OUTPATIENT
Start: 2024-09-04

## 2024-10-10 ENCOUNTER — TELEPHONE (OUTPATIENT)
Dept: PRIMARY CARE | Facility: CLINIC | Age: 74
End: 2024-10-10
Payer: MEDICARE

## 2024-10-10 RX ORDER — LOSARTAN POTASSIUM 50 MG/1
50 TABLET ORAL
COMMUNITY
Start: 2024-10-04 | End: 2024-12-03

## 2024-10-15 ENCOUNTER — APPOINTMENT (OUTPATIENT)
Dept: PRIMARY CARE | Facility: CLINIC | Age: 74
End: 2024-10-15
Payer: MEDICARE

## 2024-12-16 DIAGNOSIS — R55 SYNCOPE, UNSPECIFIED SYNCOPE TYPE: ICD-10-CM

## 2024-12-16 DIAGNOSIS — I10 PRIMARY HYPERTENSION: ICD-10-CM

## 2024-12-17 RX ORDER — POTASSIUM CHLORIDE 1500 MG/1
20 TABLET, EXTENDED RELEASE ORAL DAILY
Qty: 30 TABLET | Refills: 1 | Status: SHIPPED | OUTPATIENT
Start: 2024-12-17

## 2024-12-17 RX ORDER — METOPROLOL SUCCINATE 25 MG/1
25 TABLET, EXTENDED RELEASE ORAL DAILY
Qty: 30 TABLET | Refills: 1 | Status: SHIPPED | OUTPATIENT
Start: 2024-12-17

## 2025-03-13 ENCOUNTER — TELEPHONE (OUTPATIENT)
Dept: CARDIOLOGY | Facility: CLINIC | Age: 75
End: 2025-03-13
Payer: MEDICARE

## 2025-03-14 ENCOUNTER — TELEPHONE (OUTPATIENT)
Dept: VASCULAR SURGERY | Facility: CLINIC | Age: 75
End: 2025-03-14
Payer: MEDICARE

## 2025-03-14 DIAGNOSIS — R55 SYNCOPE, UNSPECIFIED SYNCOPE TYPE: ICD-10-CM

## 2025-03-14 DIAGNOSIS — I10 PRIMARY HYPERTENSION: ICD-10-CM

## 2025-03-14 NOTE — TELEPHONE ENCOUNTER
Call and left voice mail to set up appt for future refill told to call back to confirm date if it works for him

## 2025-03-17 RX ORDER — POTASSIUM CHLORIDE 20 MEQ/1
20 TABLET, EXTENDED RELEASE ORAL DAILY
Qty: 90 TABLET | Refills: 0 | Status: SHIPPED | OUTPATIENT
Start: 2025-03-17

## 2025-03-20 DIAGNOSIS — R55 SYNCOPE, UNSPECIFIED SYNCOPE TYPE: ICD-10-CM

## 2025-03-20 DIAGNOSIS — I10 PRIMARY HYPERTENSION: ICD-10-CM

## 2025-03-20 RX ORDER — METOPROLOL SUCCINATE 25 MG/1
25 TABLET, EXTENDED RELEASE ORAL DAILY
Qty: 30 TABLET | Refills: 1 | Status: SHIPPED | OUTPATIENT
Start: 2025-03-20

## 2025-05-22 ENCOUNTER — APPOINTMENT (OUTPATIENT)
Facility: CLINIC | Age: 75
End: 2025-05-22
Payer: MEDICARE

## 2025-06-07 DIAGNOSIS — I10 PRIMARY HYPERTENSION: ICD-10-CM

## 2025-06-07 DIAGNOSIS — R55 SYNCOPE, UNSPECIFIED SYNCOPE TYPE: ICD-10-CM

## 2025-06-09 RX ORDER — METOPROLOL SUCCINATE 25 MG/1
25 TABLET, EXTENDED RELEASE ORAL DAILY
Qty: 30 TABLET | Refills: 1 | Status: SHIPPED | OUTPATIENT
Start: 2025-06-09

## 2025-06-13 DIAGNOSIS — R55 SYNCOPE, UNSPECIFIED SYNCOPE TYPE: ICD-10-CM

## 2025-06-13 DIAGNOSIS — I10 PRIMARY HYPERTENSION: ICD-10-CM

## 2025-06-13 RX ORDER — POTASSIUM CHLORIDE 20 MEQ/1
20 TABLET, EXTENDED RELEASE ORAL DAILY
Qty: 90 TABLET | Refills: 0 | Status: SHIPPED | OUTPATIENT
Start: 2025-06-13